# Patient Record
Sex: MALE | Race: WHITE | NOT HISPANIC OR LATINO | Employment: OTHER | URBAN - NONMETROPOLITAN AREA
[De-identification: names, ages, dates, MRNs, and addresses within clinical notes are randomized per-mention and may not be internally consistent; named-entity substitution may affect disease eponyms.]

---

## 2017-06-28 ENCOUNTER — TELEPHONE (OUTPATIENT)
Dept: UROLOGY | Facility: CLINIC | Age: 62
End: 2017-06-28

## 2017-06-28 DIAGNOSIS — N13.8 BPH (BENIGN PROSTATIC HYPERTROPHY) WITH URINARY OBSTRUCTION: Primary | ICD-10-CM

## 2017-06-28 DIAGNOSIS — N40.1 BPH (BENIGN PROSTATIC HYPERTROPHY) WITH URINARY OBSTRUCTION: Primary | ICD-10-CM

## 2017-07-14 LAB — PSA SERPL-MCNC: 1.88 NG/ML (ref 0–4)

## 2017-07-28 ENCOUNTER — OFFICE VISIT (OUTPATIENT)
Dept: UROLOGY | Facility: CLINIC | Age: 62
End: 2017-07-28

## 2017-07-28 VITALS — HEIGHT: 71 IN | TEMPERATURE: 98.2 F | WEIGHT: 212 LBS | BODY MASS INDEX: 29.68 KG/M2

## 2017-07-28 DIAGNOSIS — R31.0 GROSS HEMATURIA: ICD-10-CM

## 2017-07-28 DIAGNOSIS — N40.1 BPH WITH URINARY OBSTRUCTION: Primary | ICD-10-CM

## 2017-07-28 DIAGNOSIS — N13.8 BPH WITH URINARY OBSTRUCTION: Primary | ICD-10-CM

## 2017-07-28 LAB
BILIRUB BLD-MCNC: NEGATIVE MG/DL
CLARITY, POC: CLEAR
COLOR UR: YELLOW
GLUCOSE UR STRIP-MCNC: NEGATIVE MG/DL
KETONES UR QL: NEGATIVE
LEUKOCYTE EST, POC: NEGATIVE
NITRITE UR-MCNC: NEGATIVE MG/ML
PH UR: 5.5 [PH] (ref 5–8)
PROT UR STRIP-MCNC: NEGATIVE MG/DL
RBC # UR STRIP: NEGATIVE /UL
SP GR UR: 1.02 (ref 1–1.03)
UROBILINOGEN UR QL: NORMAL

## 2017-07-28 PROCEDURE — 88112 CYTOPATH CELL ENHANCE TECH: CPT | Performed by: UROLOGY

## 2017-07-28 PROCEDURE — 81003 URINALYSIS AUTO W/O SCOPE: CPT | Performed by: UROLOGY

## 2017-07-28 PROCEDURE — 99204 OFFICE O/P NEW MOD 45 MIN: CPT | Performed by: UROLOGY

## 2017-07-28 NOTE — PROGRESS NOTES
Mr. Molina is 61 y.o. male    CHIEF COMPLAINT: I am here to follow up on my BPH. And I had blood in my urine three weeks ago     HPI  Benign Prostatic hyperplasia  Patient was initially diagnosed with benign prostatic hyperplasia approximately6 years ago. This was identified in the context of worsening voiding symptoms. Severity of the diease would be moderate. This has been managed with Watchful waiting. Watchful waiting has/have not improved the symptoms to an acceptable level to the patient.  His disease has been complicated by abdominal pain/flank pain. His most bothersome symptom(s) is/are urinary hesitancy, urinary frequency, weak stream.      Lab Results   Component Value Date    PSA 1.880 07/14/2017     Hematuria  Patient complains of gross hematuria. Onset of hematuria was about  3 weeks ago and was sudden in onset. This has not been evaluated with previous imaging. This has not been evaluated cystoscopically. The course has been resolved. Possible contributing factors to consider include None. Management of the hematuria include(s) observation which has  improved it. Associated symptoms include  Irritative voiding symptoms and Obstructive voiding symptoms..        The following portions of the patient's history were reviewed and updated as appropriate: allergies, current medications, past family history, past medical history, past social history, past surgical history and problem list.    Review of Systems   Constitutional: Negative for appetite change, chills and fever.   HENT: Negative for hearing loss and sore throat.    Eyes: Negative for pain and redness.   Respiratory: Negative for cough and shortness of breath.    Cardiovascular: Negative for chest pain and leg swelling.   Gastrointestinal: Negative for abdominal pain, anal bleeding, blood in stool, nausea and vomiting.   Endocrine: Negative for cold intolerance and heat intolerance.   Genitourinary: Positive for difficulty urinating, flank pain  "and hematuria. Negative for dysuria.   Musculoskeletal: Negative for joint swelling and myalgias.   Skin: Negative for color change and rash.   Allergic/Immunologic: Negative for immunocompromised state.   Neurological: Negative for dizziness and speech difficulty.   Hematological: Negative for adenopathy. Does not bruise/bleed easily.   Psychiatric/Behavioral: Negative for dysphoric mood and suicidal ideas.       No current outpatient prescriptions on file.    History reviewed. No pertinent past medical history.    History reviewed. No pertinent surgical history.    Social History     Social History   • Marital status:      Spouse name: N/A   • Number of children: N/A   • Years of education: N/A     Social History Main Topics   • Smoking status: Current Every Day Smoker   • Smokeless tobacco: None   • Alcohol use None   • Drug use: None   • Sexual activity: Not Asked     Other Topics Concern   • None     Social History Narrative   • None       Family History   Problem Relation Age of Onset   • Prostate cancer Father    • No Known Problems Mother        Temp 98.2 °F (36.8 °C)  Ht 71\" (180.3 cm)  Wt 212 lb (96.2 kg)  BMI 29.57 kg/m2    Physical Exam  Constitutional: He is oriented to person, place, and time. He appears well-developed and well-nourished. No distress.   HENT:   Nose: Nose normal.   Neck: Normal range of motion. Neck supple. No tracheal deviation present. No thyromegaly present.   Cardiovascular: Normal rate, regular rhythm and intact distal pulses.    No significant edema or tenderness    Pulmonary/Chest: Breath sounds normal. No accessory muscle usage. No respiratory distress.   Abdominal: Soft. Bowel sounds are normal. He exhibits no distension, no ascites and no mass. There is no hepatosplenomegaly. There is no tenderness. There is no rebound, no guarding and no CVA tenderness. No hernia.   Stool specimen is not indicated for my portion of the exam   Genitourinary: Testes normal and penis " normal. Rectal exam shows no mass, no tenderness, anal tone normal and guaiac negative stool. Tender: no nodules. Right testis shows no mass, no swelling and no tenderness. Left testis shows no mass, no swelling and no tenderness. No penile tenderness (no lesion or deformities).   Genitourinary Comments:  The urethral meatus normal in position without evidence of stricture. Epididymis without mass or tenderness. Vas Deferens is palpably normal.Anus and perineum without mass or tenderness. The seminal vesicle are without mass or enlargement. The prostate is approximately 30 ml. It is Symmetric, with a firm but global consistency. There are no nodules present. . The seminal vesicles are Palpably normal in size and without mass.     Lymphadenopathy:     He has no cervical adenopathy. No inguinal adenopathy noted on the right or left side.        Right: No inguinal adenopathy present.        Left: No inguinal adenopathy present.   Neurological: He is alert and oriented to person, place, and time.   Skin: Skin is warm and dry. No rash noted. He is not diaphoretic. No pallor.   Psychiatric: He has a normal mood and affect. His behavior is normal.   Vitals reviewed.          Results for orders placed or performed in visit on 07/28/17   POC Urinalysis Dipstick, Automated   Result Value Ref Range    Color Yellow Yellow, Straw, Dark Yellow, Idalmis    Clarity, UA Clear Clear    Glucose, UA Negative Negative, 1000 mg/dL (3+) mg/dL    Bilirubin Negative Negative    Ketones, UA Negative Negative    Specific Gravity  1.025 1.005 - 1.030    Blood, UA Negative Negative    pH, Urine 5.5 5.0 - 8.0    Protein, POC Negative Negative mg/dL    Urobilinogen, UA Normal Normal    Leukocytes Negative Negative    Nitrite, UA Negative Negative       Assessment and Plan  Diagnoses and all orders for this visit:    BPH with urinary obstruction  -     POC Urinalysis Dipstick, Automated    Gross hematuria  -     Non-gynecologic Cytology  -     US  Renal Bilateral; Future    #1. Cystoscopy as the definitive lower urinary tract study is discussed . The risks of pain and discomfort, infection, and urethral stricture are discussed with the patient including the technique used in the office setting.  All patient questions were answered.   #2. He had a CT scan done last year which showed only a left-sided stone.  I am that he get a renal ultrasound this time.  His pain was in his right flanks are made just past a right-sided stone.  #3.His voiding symptoms are stable on his current regimen. He has remained infection free since his last visit. He has no evidence of progression. We discussed symptoms that would be worrisome of either of these. We talked about the importance of being seen if he develops gross hematuria, burning with urination, or episodes that may be worrisome for inability to empty the bladder. We talked about medications that may increase the risk of urinary retention especially over the counter decongestants. This chronic issue appears stable overall.           Roly Campbell MD  07/28/17  8:24 AM

## 2017-08-01 LAB
CYTO UR: NORMAL
LAB AP CASE REPORT: NORMAL
LAB AP CLINICAL INFORMATION: NORMAL
Lab: NORMAL
PATH REPORT.FINAL DX SPEC: NORMAL
PATH REPORT.GROSS SPEC: NORMAL

## 2023-08-28 ENCOUNTER — OFFICE VISIT (OUTPATIENT)
Dept: INTERNAL MEDICINE | Facility: CLINIC | Age: 68
End: 2023-08-28
Payer: MEDICARE

## 2023-08-28 ENCOUNTER — PATIENT ROUNDING (BHMG ONLY) (OUTPATIENT)
Dept: INTERNAL MEDICINE | Facility: CLINIC | Age: 68
End: 2023-08-28

## 2023-08-28 VITALS
DIASTOLIC BLOOD PRESSURE: 81 MMHG | HEART RATE: 80 BPM | HEIGHT: 69 IN | BODY MASS INDEX: 31.4 KG/M2 | OXYGEN SATURATION: 96 % | TEMPERATURE: 98.6 F | SYSTOLIC BLOOD PRESSURE: 135 MMHG | WEIGHT: 212 LBS

## 2023-08-28 DIAGNOSIS — H69.83 EUSTACHIAN TUBE DYSFUNCTION, BILATERAL: ICD-10-CM

## 2023-08-28 DIAGNOSIS — H65.01 NON-RECURRENT ACUTE SEROUS OTITIS MEDIA OF RIGHT EAR: Primary | ICD-10-CM

## 2023-08-28 DIAGNOSIS — E66.9 CLASS 1 OBESITY WITH BODY MASS INDEX (BMI) OF 31.0 TO 31.9 IN ADULT, UNSPECIFIED OBESITY TYPE, UNSPECIFIED WHETHER SERIOUS COMORBIDITY PRESENT: ICD-10-CM

## 2023-08-28 DIAGNOSIS — M25.561 RIGHT KNEE PAIN, UNSPECIFIED CHRONICITY: ICD-10-CM

## 2023-08-28 DIAGNOSIS — S00.03XA HEMATOMA OF SCALP, INITIAL ENCOUNTER: ICD-10-CM

## 2023-08-28 RX ORDER — AMOXICILLIN 875 MG/1
875 TABLET, COATED ORAL 2 TIMES DAILY
Qty: 20 TABLET | Refills: 0 | Status: SHIPPED | OUTPATIENT
Start: 2023-08-28 | End: 2023-09-07

## 2023-08-28 RX ORDER — FLUTICASONE PROPIONATE 50 MCG
2 SPRAY, SUSPENSION (ML) NASAL DAILY
Qty: 16 G | Refills: 2 | Status: SHIPPED | OUTPATIENT
Start: 2023-08-28

## 2023-08-28 NOTE — PROGRESS NOTES
August 28, 2023    Hello, may I speak with Riley Molina?    My name is Kajal      I am  with [facility]  [address] [place] [address] [phone].    Before we get started may I verify your date of birth? 1955    I am calling to officially welcome you to our practice and ask about your recent visit. Is this a good time to talk? Yes    Tell me about your visit with us. What things went well?  I am so glad I found your office, lived in [place] [facility] All my life and did not know this office was here. I really really like Brendan, very nice.       We're always looking for ways to make our patients' experiences even better. Do you have recommendations on ways we may improve?  Yes, we need a sign at the end of [address] to let others know we are here.    Overall were you satisfied with your first visit to our practice? Yes       I appreciate you taking the time to speak with me today. Is there anything else I can do for you? No      Thank you, and have a great day.

## 2023-08-28 NOTE — PROGRESS NOTES
"Chief Complaint  Earache (Santos ears/)    Subjective        Riley Molina presents to [facility]  History of Present Illness  Patient is a 67-year-old male presenting today as a new patient with complaints of earaches. Reports the left ear started bothering him about 3 weeks ago with the right ear starting a week later. 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 Describes his right ear being tender to touch.     His right knee has been bothering him. Describes driving for extended times with his right knee swelling and \"locking up\" on him. This occurs when he makes the work trips with driving and his leg being cramped up in the car.     He describes having hit his head on a tree branch while mowing Saturday. Has a bump on the top of his head that came up which is decreasing in size today. Denies loss of consciousness, dizziness, light-headed, vision change.     Past Medical History:   Diagnosis Date    Kidney stone      Past Surgical History:   Procedure Laterality Date    KIDNEY STONE SURGERY       Social History     Socioeconomic History    Marital status:    Tobacco Use    Smoking status: Every Day     Packs/day: 1.00     Years: 22.00     Pack years: 22.00     Types: Cigarettes    Smokeless tobacco: Never   Vaping Use    Vaping Use: Never used   Substance and Sexual Activity    Alcohol use: Not Currently    Drug use: Never     Family History   Problem Relation Age of Onset    Prostate cancer Father     No Known Problems Mother        Objective   Vital Signs:  /81 (BP Location: Left arm, Patient Position: Sitting, Cuff Size: Adult)   Pulse 80   Temp 98.6 øF (37 øC) (Infrared)   Ht 175.3 cm (69\")   Wt 96.2 kg (212 lb)   SpO2 96%   BMI 31.31 kg/mý   Estimated body mass index is 31.31 kg/mý as calculated from the following:    Height as of this encounter: 175.3 cm (69\").    Weight as of this encounter: 96.2 kg (212 lb).       BMI is >= 30 " and <35. (Class 1 Obesity). The following options were offered after discussion;: exercise counseling/recommendations and nutrition counseling/recommendations    PHQ-9 Depression Screening  Little interest or pleasure in doing things? 0-->not at all   Feeling down, depressed, or hopeless? 0-->not at all   Trouble falling or staying asleep, or sleeping too much?     Feeling tired or having little energy?     Poor appetite or overeating?     Feeling bad about yourself - or that you are a failure or have let yourself or your family down?     Trouble concentrating on things, such as reading the newspaper or watching television?     Moving or speaking so slowly that other people could have noticed? Or the opposite - being so fidgety or restless that you have been moving around a lot more than usual?     Thoughts that you would be better off dead, or of hurting yourself in some way?     PHQ-9 Total Score 0   If you checked off any problems, how difficult have these problems made it for you to do your work, take care of things at home, or get along with other people?        JORDON-7: Over the last two weeks, how often have you been bothered by the following problems?  Feeling nervous, anxious or on edge: Not at all  Not being able to stop or control worrying: Not at all  Worrying too much about different things: Not at all  Trouble Relaxing: Not at all  Being so restless that it is hard to sit still: Not at all  Becoming easily annoyed or irritable: Not at all  Feeling afraid as if something awful might happen: Not at all  JORDON 7 Total Score: 0  If you checked any problems, how difficult have these problems made it for you to do your work, take care of things at home, or get along with other people: Not difficult at all      Physical Exam  Vitals and nursing note reviewed.   Constitutional:       Appearance: Normal appearance. He is obese.   HENT:      Head: Normocephalic.        Comments: Approximately 3 cm round tender  raised      Right Ear: Tympanic membrane is erythematous and bulging.      Left Ear: Tympanic membrane is bulging.      Nose: Nose normal. Congestion present.      Mouth/Throat:      Mouth: Mucous membranes are moist.      Pharynx: Oropharynx is clear. No posterior oropharyngeal erythema.   Eyes:      Conjunctiva/sclera: Conjunctivae normal.      Pupils: Pupils are equal, round, and reactive to light.   Cardiovascular:      Rate and Rhythm: Normal rate and regular rhythm.      Pulses: Normal pulses.      Heart sounds: Normal heart sounds. No murmur heard.  Pulmonary:      Effort: Pulmonary effort is normal. No respiratory distress.      Breath sounds: Normal breath sounds.   Abdominal:      General: Bowel sounds are normal.      Palpations: Abdomen is soft.   Musculoskeletal:         General: Normal range of motion.   Skin:     General: Skin is warm and dry.      Capillary Refill: Capillary refill takes less than 2 seconds.   Neurological:      General: No focal deficit present.      Mental Status: He is alert.   Psychiatric:         Mood and Affect: Mood normal.             Assessment and Plan   Diagnoses and all orders for this visit:    1. Non-recurrent acute serous otitis media of right ear (Primary)  -     amoxicillin (AMOXIL) 875 MG tablet; Take 1 tablet by mouth 2 (Two) Times a Day for 10 days.  Dispense: 20 tablet; Refill: 0    2. Eustachian tube dysfunction, bilateral  -     fluticasone (FLONASE) 50 MCG/ACT nasal spray; 2 sprays into the nostril(s) as directed by provider Daily.  Dispense: 16 g; Refill: 2    3. Hematoma of scalp, initial encounter    4. Right knee pain, unspecified chronicity    5. Class 1 obesity with body mass index (BMI) of 31.0 to 31.9 in adult, unspecified obesity type, unspecified whether serious comorbidity present             Follow Up   Return in about 3 months (around 11/28/2023) for Medicare Wellness.  Patient was given instructions and counseling regarding his condition or for  health maintenance advice. Please see specific information pulled into the AVS if appropriate.

## 2023-10-11 ENCOUNTER — OFFICE VISIT (OUTPATIENT)
Dept: INTERNAL MEDICINE | Facility: CLINIC | Age: 68
End: 2023-10-11
Payer: MEDICARE

## 2023-10-11 VITALS
HEIGHT: 69 IN | BODY MASS INDEX: 31.1 KG/M2 | OXYGEN SATURATION: 96 % | DIASTOLIC BLOOD PRESSURE: 66 MMHG | WEIGHT: 210 LBS | HEART RATE: 99 BPM | TEMPERATURE: 98.7 F | SYSTOLIC BLOOD PRESSURE: 135 MMHG

## 2023-10-11 DIAGNOSIS — J01.00 ACUTE MAXILLARY SINUSITIS, RECURRENCE NOT SPECIFIED: Primary | ICD-10-CM

## 2023-10-11 PROCEDURE — 1160F RVW MEDS BY RX/DR IN RCRD: CPT

## 2023-10-11 PROCEDURE — 99213 OFFICE O/P EST LOW 20 MIN: CPT

## 2023-10-11 PROCEDURE — 1159F MED LIST DOCD IN RCRD: CPT

## 2023-10-11 RX ORDER — BROMPHENIRAMINE MALEATE, PSEUDOEPHEDRINE HYDROCHLORIDE, AND DEXTROMETHORPHAN HYDROBROMIDE 2; 30; 10 MG/5ML; MG/5ML; MG/5ML
5 SYRUP ORAL EVERY 6 HOURS PRN
Qty: 120 ML | Refills: 2 | Status: SHIPPED | OUTPATIENT
Start: 2023-10-11

## 2023-10-11 RX ORDER — CEFDINIR 300 MG/1
300 CAPSULE ORAL 2 TIMES DAILY
Qty: 14 CAPSULE | Refills: 0 | Status: SHIPPED | OUTPATIENT
Start: 2023-10-11 | End: 2023-10-18

## 2023-10-11 NOTE — PROGRESS NOTES
Chief Complaint  URI and Sinus Problem    Subjective        Riley Molina presents to [facility]  URI   This is a new problem. The current episode started in the past 7 days. There has been no fever. Associated symptoms include congestion, coughing, rhinorrhea, sinus pain and a sore throat. He has tried antihistamine and decongestant for the symptoms. The treatment provided mild relief.   Sinus Problem  This is a new problem. The current episode started in the past 7 days. There has been no fever. Associated symptoms include congestion, coughing, sinus pressure and a sore throat. Past treatments include oral decongestants. The treatment provided mild relief.     Patient is a 67-year-old male who presents today with complaints of sinus congestion that started on Thursday 10/5/2023. The patient had traveled to [facility] last week for work coming home on Wednesday. Has taken home COVID tests with these being negative. Reports taking Dayquil, Nyquil, and mucinex the past week with no improvement. Started using a breathing treatment yesterday with decreased congestion and cough becoming productive. No known fever. Denies chest pain or shortness of breath.      Past Medical History:   Diagnosis Date    Kidney stone      Past Surgical History:   Procedure Laterality Date    KIDNEY STONE SURGERY       Social History     Socioeconomic History    Marital status:    Tobacco Use    Smoking status: Every Day     Packs/day: 1.00     Years: 22.00     Additional pack years: 0.00     Total pack years: 22.00     Types: Cigarettes    Smokeless tobacco: Never   Vaping Use    Vaping Use: Never used   Substance and Sexual Activity    Alcohol use: Not Currently    Drug use: Never     Family History   Problem Relation Age of Onset    Prostate cancer Father     No Known Problems Mother        Objective   Vital Signs:  /66 (BP Location: Left arm, Patient Position: Sitting, Cuff Size: Adult)   " Pulse 99   Temp 98.7 øF (37.1 øC) (Infrared)   Ht 175.3 cm (69\")   Wt 95.3 kg (210 lb)   SpO2 96%   BMI 31.01 kg/mý   Estimated body mass index is 31.01 kg/mý as calculated from the following:    Height as of this encounter: 175.3 cm (69\").    Weight as of this encounter: 95.3 kg (210 lb).          Physical Exam  Constitutional:       General: He is not in acute distress.     Appearance: Normal appearance.   HENT:      Head: Normocephalic.      Right Ear: Tympanic membrane normal.      Left Ear: Tympanic membrane normal.      Nose: Congestion and rhinorrhea present.      Mouth/Throat:      Mouth: Mucous membranes are moist.      Pharynx: Posterior oropharyngeal erythema present.   Eyes:      Pupils: Pupils are equal, round, and reactive to light.   Cardiovascular:      Rate and Rhythm: Normal rate and regular rhythm.      Pulses: Normal pulses.      Heart sounds: Normal heart sounds. No murmur heard.  Pulmonary:      Effort: Pulmonary effort is normal. No respiratory distress.      Breath sounds: Normal breath sounds. No wheezing or rhonchi.   Neurological:      Mental Status: He is alert.        Result Review :           Assessment and Plan   Diagnoses and all orders for this visit:    1. Acute maxillary sinusitis, recurrence not specified (Primary)  -     cefdinir (OMNICEF) 300 MG capsule; Take 1 capsule by mouth 2 (Two) Times a Day for 7 days.  Dispense: 14 capsule; Refill: 0      - Will treat as bacterial sinusitis with symptoms beginning a week ago. Ok to continue use of mucinex.         Follow Up   Return if symptoms worsen or fail to improve.  Patient was given instructions and counseling regarding his condition or for health maintenance advice. Please see specific information pulled into the AVS if appropriate.       "

## 2023-10-17 ENCOUNTER — TELEPHONE (OUTPATIENT)
Dept: INTERNAL MEDICINE | Facility: CLINIC | Age: 68
End: 2023-10-17

## 2023-10-17 NOTE — TELEPHONE ENCOUNTER
Patient notified of recommendations to stop abx and start probiotics and increase fluids. He voiced understanding and had no other questions.

## 2023-10-17 NOTE — TELEPHONE ENCOUNTER
Caller: Riley Molina    Relationship: Self    Best call back number: [phone]    What is the best time to reach you: ANYTIME    Who are you requesting to speak with (clinical staff, provider,  specific staff member): CLINICAL STAFF     Do you know the name of the person who called: UNSURE     What was the call regarding: PATIENT IS RETURNING A MISSED CALL     Is it okay if the provider responds through MyChart: PREFERS A CALL BACK

## 2023-10-17 NOTE — TELEPHONE ENCOUNTER
Caller: Winchester Riley    Relationship: Self    Best call back number: [phone]     What medication are you requesting: 'BACTERIA INFECTION'  HAS DIARRHEA, feels better from the sinus issues but now having diarrhea    What are your current symptoms:  see above    How long have you been experiencing symptoms: 6 DAYS    Have you had these symptoms before:    [x] Yes  [] No    Have you been treated for these symptoms before:   [x] Yes  [] No    If a prescription is needed, what is your preferred pharmacy and phone number:      Additional notes:

## 2024-05-02 ENCOUNTER — TELEPHONE (OUTPATIENT)
Dept: INTERNAL MEDICINE | Facility: CLINIC | Age: 69
End: 2024-05-02
Payer: MEDICARE

## 2024-05-02 NOTE — TELEPHONE ENCOUNTER
Called patient for MWV. He stated he is to busy at this time, but if he gets a day it rains, he will call us.

## 2025-03-03 ENCOUNTER — OFFICE VISIT (OUTPATIENT)
Dept: INTERNAL MEDICINE | Facility: CLINIC | Age: 70
End: 2025-03-03
Payer: MEDICARE

## 2025-03-03 VITALS
BODY MASS INDEX: 30.07 KG/M2 | OXYGEN SATURATION: 97 % | HEIGHT: 69 IN | WEIGHT: 203 LBS | RESPIRATION RATE: 16 BRPM | DIASTOLIC BLOOD PRESSURE: 79 MMHG | HEART RATE: 90 BPM | TEMPERATURE: 98.6 F | SYSTOLIC BLOOD PRESSURE: 122 MMHG

## 2025-03-03 DIAGNOSIS — R31.9 URINARY TRACT INFECTION WITH HEMATURIA, SITE UNSPECIFIED: Primary | ICD-10-CM

## 2025-03-03 DIAGNOSIS — N20.0 STAGHORN CALCULUS: ICD-10-CM

## 2025-03-03 DIAGNOSIS — R39.9 LOWER URINARY TRACT SYMPTOMS (LUTS): ICD-10-CM

## 2025-03-03 DIAGNOSIS — N39.0 URINARY TRACT INFECTION WITH HEMATURIA, SITE UNSPECIFIED: Primary | ICD-10-CM

## 2025-03-03 PROCEDURE — 1159F MED LIST DOCD IN RCRD: CPT

## 2025-03-03 PROCEDURE — 1160F RVW MEDS BY RX/DR IN RCRD: CPT

## 2025-03-03 PROCEDURE — 99214 OFFICE O/P EST MOD 30 MIN: CPT

## 2025-03-03 RX ORDER — TAMSULOSIN HYDROCHLORIDE 0.4 MG/1
1 CAPSULE ORAL DAILY
Qty: 30 CAPSULE | Refills: 2 | Status: SHIPPED | OUTPATIENT
Start: 2025-03-03

## 2025-03-03 RX ORDER — CIPROFLOXACIN 500 MG/1
1 TABLET, FILM COATED ORAL EVERY 12 HOURS SCHEDULED
COMMUNITY
Start: 2025-02-21 | End: 2025-03-03 | Stop reason: SDUPTHER

## 2025-03-03 RX ORDER — KETOROLAC TROMETHAMINE 10 MG/1
10 TABLET, FILM COATED ORAL 3 TIMES DAILY PRN
COMMUNITY
Start: 2025-02-21

## 2025-03-03 RX ORDER — CIPROFLOXACIN 500 MG/1
500 TABLET, FILM COATED ORAL EVERY 12 HOURS SCHEDULED
Qty: 14 TABLET | Refills: 0 | Status: SHIPPED | OUTPATIENT
Start: 2025-03-03 | End: 2025-03-10

## 2025-03-03 RX ORDER — TAMSULOSIN HYDROCHLORIDE 0.4 MG/1
1 CAPSULE ORAL DAILY
COMMUNITY
Start: 2025-02-21 | End: 2025-03-03 | Stop reason: SDUPTHER

## 2025-03-03 NOTE — PROGRESS NOTES
Chief Complaint  Hospital Follow Up Visit (Seen at [facility] ER for kidney stone and hernia, 02/21/25)    Subjective        Riley Molina presents to [facility]  History of Present Illness  Riley Molina is a 69-year-old male presenting today for hospital follow-up.   He had presented to [facility] on 2/21/2025 with complaints of nausea and right sided flank/groin pain and had started to urinate blood. He was experiencing shakes and was found to have UTI. CT of abdomen revealed a 45mm staghorn calculus in his left kidney. He was given IV fluids in ER sent home with oral antibiotics.     Continues to have pain in lower right flank and right groin. He has been taking over the counter medication with minimal relief. Reports having been taking the antibiotic with improvement in his pain and urine clearing up. Would like referral to urology for further evaluation/management of the left kidney stone.     Past Medical History:   Diagnosis Date    Kidney stone      Past Surgical History:   Procedure Laterality Date    KIDNEY STONE SURGERY       Social History     Socioeconomic History    Marital status:    Tobacco Use    Smoking status: Every Day     Current packs/day: 1.00     Average packs/day: 1 pack/day for 22.0 years (22.0 ttl pk-yrs)     Types: Cigarettes     Passive exposure: Current    Smokeless tobacco: Never   Vaping Use    Vaping status: Never Used   Substance and Sexual Activity    Alcohol use: Not Currently    Drug use: Never    Sexual activity: Defer     Family History   Problem Relation Age of Onset    Prostate cancer Father     No Known Problems Mother        Medications:  Current Outpatient Medications   Medication Sig Dispense Refill    ciprofloxacin (CIPRO) 500 MG tablet Take 1 tablet by mouth Every 12 (Twelve) Hours for 7 days. 14 tablet 0    tamsulosin (FLOMAX) 0.4 MG capsule 24 hr capsule Take 1 capsule by mouth Daily. 30 capsule 2     "brompheniramine-pseudoephedrine-DM 30-2-10 MG/5ML syrup Take 5 mL by mouth Every 6 (Six) Hours As Needed for Congestion, Cough or Allergies. (Patient not taking: Reported on 3/3/2025) 120 mL 2    fluticasone (FLONASE) 50 MCG/ACT nasal spray 2 sprays into the nostril(s) as directed by provider Daily. (Patient not taking: Reported on 3/3/2025) 16 g 2    ketorolac (TORADOL) 10 MG tablet Take 1 tablet by mouth 3 (Three) Times a Day As Needed. for pain (Patient not taking: Reported on 3/3/2025)       No current facility-administered medications for this visit.       Review of Systems  Review of systems is negative unless otherwise specified in HPI.    Objective   Vital Signs:  /79 (BP Location: Left arm, Patient Position: Sitting, Cuff Size: Adult)   Pulse 90   Temp 98.6 °F (37 °C) (Infrared)   Resp 16   Ht 175.3 cm (69\")   Wt 92.1 kg (203 lb)   SpO2 97%   BMI 29.98 kg/m²   Estimated body mass index is 29.98 kg/m² as calculated from the following:    Height as of this encounter: 175.3 cm (69\").    Weight as of this encounter: 92.1 kg (203 lb).       BMI is >= 25 and <30. (Overweight) The following options were offered after discussion;: weight loss educational material (shared in after visit summary)    PHQ-9 Depression Screening  Little interest or pleasure in doing things? Not at all   Feeling down, depressed, or hopeless? Not at all   PHQ-2 Total Score 0   Trouble falling or staying asleep, or sleeping too much?     Feeling tired or having little energy?     Poor appetite or overeating?     Feeling bad about yourself - or that you are a failure or have let yourself or your family down?     Trouble concentrating on things, such as reading the newspaper or watching television?     Moving or speaking so slowly that other people could have noticed? Or the opposite - being so fidgety or restless that you have been moving around a lot more than usual?     Thoughts that you would be better off dead, or of " hurting yourself in some way?     PHQ-9 Total Score     If you checked off any problems, how difficult have these problems made it for you to do your work, take care of things at home, or get along with other people? Not difficult at all       Physical Exam  Constitutional:       General: He is not in acute distress.     Appearance: He is ill-appearing.   HENT:      Head: Normocephalic.      Nose: Nose normal.      Mouth/Throat:      Mouth: Mucous membranes are moist.   Eyes:      Pupils: Pupils are equal, round, and reactive to light.   Cardiovascular:      Rate and Rhythm: Normal rate.   Pulmonary:      Effort: Pulmonary effort is normal. No respiratory distress.   Abdominal:      Tenderness: There is abdominal tenderness.   Musculoskeletal:         General: Normal range of motion.      Cervical back: Normal range of motion.   Skin:     General: Skin is warm and dry.   Neurological:      Mental Status: He is alert.          Result Review :  The following data was reviewed by: LORI Carcamo on 03/03/2025:  Data reviewed : Radiologic studies CT abdomen and pelvis from 2/21/2025.           Assessment and Plan   Diagnoses and all orders for this visit:    1. Urinary tract infection with hematuria, site unspecified (Primary)  -     ciprofloxacin (CIPRO) 500 MG tablet; Take 1 tablet by mouth Every 12 (Twelve) Hours for 7 days.  Dispense: 14 tablet; Refill: 0    2. Staghorn calculus  -     Ambulatory Referral to Urology    3. Lower urinary tract symptoms (LUTS)  -     tamsulosin (FLOMAX) 0.4 MG capsule 24 hr capsule; Take 1 capsule by mouth Daily.  Dispense: 30 capsule; Refill: 2      - Will continue antibiotic with ongoing flank pain.   - Discussed staghorn calculus and will place referral to urology for further evaluation/treatment.  - With lower urinary tract symptoms and known renal stone will continue tamsulosin.            Follow Up   Return if symptoms worsen or fail to improve.  Patient was given  instructions and counseling regarding his condition or for health maintenance advice. Please see specific information pulled into the AVS if appropriate.     Signed by:    LORI Carcamo Date: 03/03/25

## 2025-06-06 DIAGNOSIS — R39.9 LOWER URINARY TRACT SYMPTOMS (LUTS): ICD-10-CM

## 2025-06-06 RX ORDER — TAMSULOSIN HYDROCHLORIDE 0.4 MG/1
1 CAPSULE ORAL DAILY
Qty: 90 CAPSULE | Refills: 1 | Status: SHIPPED | OUTPATIENT
Start: 2025-06-06

## 2025-06-24 ENCOUNTER — TELEPHONE (OUTPATIENT)
Dept: INTERNAL MEDICINE | Facility: CLINIC | Age: 70
End: 2025-06-24
Payer: MEDICARE

## 2025-06-24 DIAGNOSIS — N20.0 STAGHORN CALCULUS: Primary | ICD-10-CM

## 2025-06-24 NOTE — TELEPHONE ENCOUNTER
Spoke with patients daughter.   45 mm staghorn in left kidney    Two failed PCNL procedures. Currently has a LT stent from procedure that failed on May 28th. Not happy with care he is getting from Dr. Scanlon in [place]. I am going to message Esper[facility] and see what we need to do. Does the stent need to be out before a transfer of care or will they see him with it still in place.

## 2025-06-24 NOTE — TELEPHONE ENCOUNTER
Caller: OFELIA    Relationship: Child    Best call back number: [phone]    What is the medical concern/diagnosis: KIDNEY STONES    What specialty or service is being requested: UROLOGY    Any additional details: PT IS NEEDING ANEW REFERRAL TO UROLOGY AS PT HAS BEEN UNABLE TO GET INTO LAST OFFICE THAT WAS REFERRED TO. PT'S DAUGHTER ASKING IF PROVIDER CAN PLACE ANOTHER REFERRAL TO SOMEWHERE CLOSER WITH ANY PROVIDER. HOPING TO GET THIS GOING ASAP. PLEASE CALL BACK WITH ADVISEMENT

## 2025-06-30 DIAGNOSIS — N20.0 STAGHORN CALCULUS: Primary | ICD-10-CM
